# Patient Record
Sex: MALE | NOT HISPANIC OR LATINO
[De-identification: names, ages, dates, MRNs, and addresses within clinical notes are randomized per-mention and may not be internally consistent; named-entity substitution may affect disease eponyms.]

---

## 2017-08-12 ENCOUNTER — APPOINTMENT (OUTPATIENT)
Dept: RADIOLOGY | Facility: IMAGING CENTER | Age: 22
End: 2017-08-12
Payer: COMMERCIAL

## 2017-08-12 ENCOUNTER — OUTPATIENT (OUTPATIENT)
Dept: OUTPATIENT SERVICES | Facility: HOSPITAL | Age: 22
LOS: 1 days | End: 2017-08-12
Payer: COMMERCIAL

## 2017-08-12 DIAGNOSIS — Z00.8 ENCOUNTER FOR OTHER GENERAL EXAMINATION: ICD-10-CM

## 2017-08-12 PROCEDURE — 73030 X-RAY EXAM OF SHOULDER: CPT

## 2017-08-12 PROCEDURE — 73030 X-RAY EXAM OF SHOULDER: CPT | Mod: 26,LT

## 2017-11-19 ENCOUNTER — EMERGENCY (EMERGENCY)
Facility: HOSPITAL | Age: 22
LOS: 1 days | Discharge: HOME | End: 2017-11-19
Attending: EMERGENCY MEDICINE | Admitting: EMERGENCY MEDICINE
Payer: COMMERCIAL

## 2017-11-19 VITALS
RESPIRATION RATE: 18 BRPM | OXYGEN SATURATION: 100 % | SYSTOLIC BLOOD PRESSURE: 116 MMHG | HEART RATE: 75 BPM | TEMPERATURE: 98 F | DIASTOLIC BLOOD PRESSURE: 59 MMHG

## 2017-11-19 VITALS
OXYGEN SATURATION: 100 % | HEART RATE: 90 BPM | TEMPERATURE: 99 F | RESPIRATION RATE: 16 BRPM | DIASTOLIC BLOOD PRESSURE: 64 MMHG | SYSTOLIC BLOOD PRESSURE: 103 MMHG

## 2017-11-19 LAB
APAP SERPL-MCNC: < 15 UG/ML — LOW (ref 15–25)
BARBITURATES MEASUREMENT: NEGATIVE — SIGNIFICANT CHANGE UP
BENZODIAZ SERPL-MCNC: NEGATIVE — SIGNIFICANT CHANGE UP
ETHANOL BLD-MCNC: 234 MG/DL — HIGH
SALICYLATES SERPL-MCNC: < 5 MG/DL — LOW (ref 15–30)

## 2017-11-19 PROCEDURE — 99284 EMERGENCY DEPT VISIT MOD MDM: CPT | Mod: 25

## 2017-11-19 NOTE — ED ADULT NURSE NOTE - OBJECTIVE STATEMENT
Pt. received in room 29. Pt. is A&O x3 and ambulatory at baseline. Pt. appears intoxicated at present. Pt. states he was drinking, unsure of amount and states "probably a lot." Pt. does not know how he got to the hospital. Pt. denies drug use, SOB, chest pain, pain, nausea. Respirations are even and unlabored on room air. Pt. placed on cardiac monitor. VS as noted. Will continue to monitor.

## 2017-11-19 NOTE — ED ADULT TRIAGE NOTE - CHIEF COMPLAINT QUOTE
pt BIBEMS, found slumped over outside, leaning on a fence. pt states he was at a friends house earlier tonight, admits to drinking liquor. pt unsure of how much alcohol he drank. denies drug use. pt appears intoxicated, easily arousable to verbal stimuli. denies any pain or discomfort. in NAD.

## 2017-11-19 NOTE — ED PROVIDER NOTE - MEDICAL DECISION MAKING DETAILS
21yo M no sig PMHx p/w CC etoh intox - no pain/no evidence of trauma - will observe and DC when clinically sober.

## 2017-11-19 NOTE — ED PROVIDER NOTE - CARE PLAN
Principal Discharge DX:	Alcohol intoxication Principal Discharge DX:	Alcohol intoxication  Instructions for follow-up, activity and diet:	-- Please do not drink alcohol or ingest any mind-altering substances and drive or make important decisions.   Please cut down on your substance use/abuse.  -- You were given information about substance use/abuse.  Please review this literature.  -- If you have any worsening of symptoms please return to the ED immediately.  -- Please follow-up with your primary care doctor in the next 2-3 days.

## 2017-11-19 NOTE — ED PROVIDER NOTE - PROGRESS NOTE DETAILS
[place]lepfish: Pt sleeping, easily arousable, awaiting sobriety. Signed out. [place]: Patient reassessed: No complaints, vital signs normal, resting comfortably, A&Ox3, speech clear, gait normal. Pt is clinically sober. Pt states he had >10 alcoholic drinks last night, denies drug use or trauma. Lives with his mother, will take a cab home.

## 2017-11-19 NOTE — ED PROVIDER NOTE - OBJECTIVE STATEMENT
21yo M no sig PMHx p/w CC etoh intox. Pt. appears clinically intoxicated. States he was drinking tonight, doesn't recall how much he drank, he says "probably a lot". Pt. know he is in the hospital but does not recall how he got here. Denies pain or discomfort. No N/V/D, abdominal pain. 23yo M no sig PMHx p/w CC etoh intox. Pt. appears clinically intoxicated. States he was drinking tonight, doesn't recall how much he drank, he says "probably a lot". Pt. know he is in the hospital but does not recall how he got here. Denies pain or discomfort. No N/V/D, abdominal pain.  Klepfish: 22M, per triage note "pt BIBEMS, found slumped over outside, leaning on a fence. pt states he was at a friends house earlier tonight, admits to drinking liquor. pt unsure of how much alcohol he drank. denies drug use. pt appears intoxicated, easily arousable to verbal stimuli"  Pt admits to etoh. Denies pain anywhere, falls/trauma. Denies other toxic ingestions.

## 2017-11-19 NOTE — ED ADULT NURSE REASSESSMENT NOTE - NS ED NURSE REASSESS COMMENT FT1
Pt. valuables (3 cards, $81.57 cash, [place] [employer] card, student ID, united healthcare card, [facility] 's license, 1 black iphone, 1 car key, 1 house key, 1 pair of black headphones, wallet) secured with security by RN and ALYSA Pena. Pt. belongings labeled and locked in closet across from room 21 (1 , 1 pair of brown boots, 1 pair of navy blue socks, 1 light blue dress shirt, 1 white t-shirt, 1 pair of black pants, 1 gray sweater, and 1 black jacket). Pt. valuables (3 cards, $81.57 cash, [place] [employer] card, student ID, united healthcare card, [facility] 's license, 1 black iphone, 1 car key, 1 house key, 1 pair of black headphones, wallet) secured with security by RN and ALYSA Pena. Pt. unable to sign valuable form envelope at present. Pt. appears intoxicated and is unable to hold pen. Pt. 2 bags of belongings labeled and locked in closet across from room 21 (1 , 1 pair of brown boots, 1 pair of navy blue socks, 1 light blue dress shirt, 1 white t-shirt, 1 pair of black pants, 1 gray sweater, and 1 black jacket). Pt. valuables (3 cards, $81.57 cash, [place] [employer] card, student ID, united healthcare card, [facility] 's license, 1 black iphone, 1 car key, 1 house key, 1 pair of black headphones, wallet) secured with security by RN and ALYSA Barron. Pt. unable to sign valuable form envelope at present. Pt. appears intoxicated and is unable to hold pen. Pt. 2 bags of belongings labeled and locked in closet across from room 21 (1 , 1 pair of brown boots, 1 pair of navy blue socks, 1 light blue dress shirt, 1 white t-shirt, 1 pair of black pants, 1 gray sweater, and 1 black jacket).

## 2017-11-19 NOTE — ED PROVIDER NOTE - ATTENDING CONTRIBUTION TO CARE
22M no PMH BIBEMS after being found outside. Pt admits to etoh, denies any other complaints though poor historian in current state. Vitals wnl, exam as above.  ddx: Likely etoh intox  Observe for sobriety, reassess.

## 2017-11-19 NOTE — ED PROVIDER NOTE - PLAN OF CARE
-- Please do not drink alcohol or ingest any mind-altering substances and drive or make important decisions.   Please cut down on your substance use/abuse.  -- You were given information about substance use/abuse.  Please review this literature.  -- If you have any worsening of symptoms please return to the ED immediately.  -- Please follow-up with your primary care doctor in the next 2-3 days.

## 2017-12-17 ENCOUNTER — TRANSCRIPTION ENCOUNTER (OUTPATIENT)
Age: 22
End: 2017-12-17

## 2018-06-01 NOTE — ED PROVIDER NOTE - NS ED ATTENDING STATEMENT MOD
Abdominal Pain, N/V/D
I have personally seen and examined this patient.  I have fully participated in the care of this patient. I have reviewed all pertinent clinical information, including history, physical exam, plan and the Resident’s note and agree except as noted.

## 2020-07-02 ENCOUNTER — TRANSCRIPTION ENCOUNTER (OUTPATIENT)
Age: 25
End: 2020-07-02

## 2020-10-14 ENCOUNTER — TRANSCRIPTION ENCOUNTER (OUTPATIENT)
Age: 25
End: 2020-10-14

## 2020-10-31 ENCOUNTER — TRANSCRIPTION ENCOUNTER (OUTPATIENT)
Age: 25
End: 2020-10-31

## 2020-11-02 ENCOUNTER — TRANSCRIPTION ENCOUNTER (OUTPATIENT)
Age: 25
End: 2020-11-02

## 2021-04-30 ENCOUNTER — TRANSCRIPTION ENCOUNTER (OUTPATIENT)
Age: 26
End: 2021-04-30

## 2021-08-22 ENCOUNTER — TRANSCRIPTION ENCOUNTER (OUTPATIENT)
Age: 26
End: 2021-08-22

## 2022-03-18 NOTE — ED ADULT NURSE NOTE - NS TRANSFER PATIENT BELONGINGS
Clothing Cheek Interpolation Flap Division And Inset Text: Division and inset of the cheek interpolation flap was performed to achieve optimal aesthetic result, restore normal anatomic appearance and avoid distortion of normal anatomy, expedite and facilitate wound healing, achieve optimal functional result and because linear closure either not possible or would produce suboptimal result. The patient was prepped and draped in the usual manner. The pedicle was infiltrated with local anesthesia. The pedicle was sectioned with a #15 blade. The pedicle was de-bulked and trimmed to match the shape of the defect. Hemostasis was achieved. The flap donor site and free margin of the flap were secured with deep buried sutures and the wound edges were re-approximated.

## 2023-06-13 NOTE — ED PROVIDER NOTE - AMOUNT INGESTED (APPROXIMATELY)
unknown Benzoyl Peroxide Pregnancy And Lactation Text: This medication is Pregnancy Category C. It is unknown if benzoyl peroxide is excreted in breast milk.